# Patient Record
(demographics unavailable — no encounter records)

---

## 2019-01-09 NOTE — REP
Left small finger series:  Two views.

 

History:  Post reduction views.

 

Findings:  AP and lateral views of the small finger demonstrate normal alignment

of the PIP joint.  There is soft tissue swelling..  There is a 2 mm bony ossicle

at the palmar aspect of the PIP joint adjacent to the distal end of the proximal

phalanx.  This has a rounded corticated appearance.  It may be a chip fracture

fragment, possibly old.  No other evidence of fracture.

 

 

Electronically Signed by

Lucas Betancourt MD 01/09/2019 09:54 A

## 2019-01-09 NOTE — REP
Left hand series:  Four views.

 

History:  Trauma.

 

Findings:  There is a dorsal dislocation of the PIP joint of the small finger.  I

note that the identical injury was documented on a comparison radiograph of the

left hand from March 6, 2014.  No visible fracture is seen.  Also noted is

hyperextension of the PIP joint of the long finger.  This finding was also

present previously.  It implies flexor tendon injury in the past.  No acute

fracture or subluxation is seen.

 

Impression:

 

1.  Dorsal dislocation PIP joint small finger, recurrent injury.

 

2.  Hyperextension PIP joint long finger, unchanged from March 2014.  Question

flexor tendon injury to this digit.

 

 

Electronically Signed by

Lucas Betancourt MD 01/09/2019 09:35 A